# Patient Record
Sex: MALE | Race: BLACK OR AFRICAN AMERICAN | NOT HISPANIC OR LATINO | Employment: STUDENT | URBAN - METROPOLITAN AREA
[De-identification: names, ages, dates, MRNs, and addresses within clinical notes are randomized per-mention and may not be internally consistent; named-entity substitution may affect disease eponyms.]

---

## 2023-07-22 ENCOUNTER — HOSPITAL ENCOUNTER (EMERGENCY)
Facility: HOSPITAL | Age: 28
Discharge: HOME | End: 2023-07-22
Attending: EMERGENCY MEDICINE
Payer: MEDICAID

## 2023-07-22 VITALS
SYSTOLIC BLOOD PRESSURE: 154 MMHG | OXYGEN SATURATION: 98 % | HEART RATE: 77 BPM | TEMPERATURE: 98 F | DIASTOLIC BLOOD PRESSURE: 94 MMHG | RESPIRATION RATE: 16 BRPM | WEIGHT: 238.75 LBS

## 2023-07-22 DIAGNOSIS — S02.2XXA CLOSED FRACTURE OF NASAL BONE, INITIAL ENCOUNTER: ICD-10-CM
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
## 2023-07-22 PROCEDURE — 90471 IMMUNIZATION ADMIN: CPT | Performed by: NURSE PRACTITIONER

## 2023-07-22 PROCEDURE — 25000003 PHARM REV CODE 250: Performed by: NURSE PRACTITIONER

## 2023-07-22 PROCEDURE — 99285 EMERGENCY DEPT VISIT HI MDM: CPT | Mod: 25

## 2023-07-22 PROCEDURE — 90715 TDAP VACCINE 7 YRS/> IM: CPT | Performed by: NURSE PRACTITIONER

## 2023-07-22 PROCEDURE — 63600175 PHARM REV CODE 636 W HCPCS: Performed by: NURSE PRACTITIONER

## 2023-07-22 RX ORDER — HYDROCODONE BITARTRATE AND ACETAMINOPHEN 5; 325 MG/1; MG/1
1 TABLET ORAL EVERY 6 HOURS PRN
Qty: 12 TABLET | Refills: 0 | Status: SHIPPED | OUTPATIENT
Start: 2023-07-22

## 2023-07-22 RX ORDER — AMOXICILLIN AND CLAVULANATE POTASSIUM 875; 125 MG/1; MG/1
1 TABLET, FILM COATED ORAL
Status: COMPLETED | OUTPATIENT
Start: 2023-07-22 | End: 2023-07-22

## 2023-07-22 RX ORDER — AMOXICILLIN AND CLAVULANATE POTASSIUM 875; 125 MG/1; MG/1
1 TABLET, FILM COATED ORAL 2 TIMES DAILY
Qty: 14 TABLET | Refills: 0 | Status: SHIPPED | OUTPATIENT
Start: 2023-07-22 | End: 2023-07-29

## 2023-07-22 RX ORDER — ONDANSETRON 4 MG/1
4 TABLET, ORALLY DISINTEGRATING ORAL
Status: COMPLETED | OUTPATIENT
Start: 2023-07-22 | End: 2023-07-22

## 2023-07-22 RX ADMIN — AMOXICILLIN AND CLAVULANATE POTASSIUM 1 TABLET: 875; 125 TABLET, FILM COATED ORAL at 12:07

## 2023-07-22 RX ADMIN — TETANUS TOXOID, REDUCED DIPHTHERIA TOXOID AND ACELLULAR PERTUSSIS VACCINE, ADSORBED 0.5 ML: 5; 2.5; 8; 8; 2.5 SUSPENSION INTRAMUSCULAR at 12:07

## 2023-07-22 RX ADMIN — ONDANSETRON 4 MG: 4 TABLET, ORALLY DISINTEGRATING ORAL at 12:07

## 2023-07-22 NOTE — Clinical Note
"Bandar Tripp" Miguel was seen and treated in our emergency department on 7/22/2023.  He may return to work on 07/25/2023.       If you have any questions or concerns, please don't hesitate to call.      Gita LOPEZ    "
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
## 2024-06-01 ENCOUNTER — HOSPITAL ENCOUNTER (EMERGENCY)
Facility: HOSPITAL | Age: 29
Discharge: HOME | End: 2024-06-01
Attending: EMERGENCY MEDICINE

## 2024-06-01 VITALS
SYSTOLIC BLOOD PRESSURE: 156 MMHG | DIASTOLIC BLOOD PRESSURE: 99 MMHG | TEMPERATURE: 98 F | RESPIRATION RATE: 16 BRPM | WEIGHT: 233.25 LBS | HEART RATE: 66 BPM | OXYGEN SATURATION: 100 %

## 2024-06-01 DIAGNOSIS — R10.84 GENERALIZED ABDOMINAL PAIN: Primary | ICD-10-CM

## 2024-06-01 LAB
ALBUMIN SERPL BCP-MCNC: 4.3 G/DL (ref 3.5–5.2)
ALP SERPL-CCNC: 72 U/L (ref 55–135)
ALT SERPL W/O P-5'-P-CCNC: 35 U/L (ref 10–44)
ANION GAP SERPL CALC-SCNC: 10 MMOL/L (ref 8–16)
AST SERPL-CCNC: 28 U/L (ref 10–40)
BASOPHILS # BLD AUTO: 0.03 K/UL (ref 0–0.2)
BASOPHILS NFR BLD: 0.3 % (ref 0–1.9)
BILIRUB SERPL-MCNC: 0.5 MG/DL (ref 0.1–1)
BILIRUB UR QL STRIP: NEGATIVE
BUN SERPL-MCNC: 12 MG/DL (ref 6–20)
CALCIUM SERPL-MCNC: 9.8 MG/DL (ref 8.7–10.5)
CHLORIDE SERPL-SCNC: 103 MMOL/L (ref 95–110)
CLARITY UR: CLEAR
CO2 SERPL-SCNC: 26 MMOL/L (ref 23–29)
COLOR UR: YELLOW
CREAT SERPL-MCNC: 1.2 MG/DL (ref 0.5–1.4)
DIFFERENTIAL METHOD BLD: ABNORMAL
EOSINOPHIL # BLD AUTO: 0.1 K/UL (ref 0–0.5)
EOSINOPHIL NFR BLD: 1.4 % (ref 0–8)
ERYTHROCYTE [DISTWIDTH] IN BLOOD BY AUTOMATED COUNT: 13.2 % (ref 11.5–14.5)
EST. GFR  (NO RACE VARIABLE): >60 ML/MIN/1.73 M^2
GLUCOSE SERPL-MCNC: 85 MG/DL (ref 70–110)
GLUCOSE UR QL STRIP: NEGATIVE
HCT VFR BLD AUTO: 40.7 % (ref 40–54)
HCV AB SERPL QL IA: NEGATIVE
HEP C VIRUS HOLD SPECIMEN: NORMAL
HGB BLD-MCNC: 13.3 G/DL (ref 14–18)
HGB UR QL STRIP: NEGATIVE
HIV 1+2 AB+HIV1 P24 AG SERPL QL IA: NEGATIVE
IMM GRANULOCYTES # BLD AUTO: 0.05 K/UL (ref 0–0.04)
IMM GRANULOCYTES NFR BLD AUTO: 0.5 % (ref 0–0.5)
KETONES UR QL STRIP: NEGATIVE
LEUKOCYTE ESTERASE UR QL STRIP: NEGATIVE
LYMPHOCYTES # BLD AUTO: 2.3 K/UL (ref 1–4.8)
LYMPHOCYTES NFR BLD: 25.3 % (ref 18–48)
MCH RBC QN AUTO: 27.3 PG (ref 27–31)
MCHC RBC AUTO-ENTMCNC: 32.7 G/DL (ref 32–36)
MCV RBC AUTO: 84 FL (ref 82–98)
MONOCYTES # BLD AUTO: 0.6 K/UL (ref 0.3–1)
MONOCYTES NFR BLD: 7 % (ref 4–15)
NEUTROPHILS # BLD AUTO: 6 K/UL (ref 1.8–7.7)
NEUTROPHILS NFR BLD: 65.5 % (ref 38–73)
NITRITE UR QL STRIP: NEGATIVE
NRBC BLD-RTO: 0 /100 WBC
PH UR STRIP: 6 [PH] (ref 5–8)
PLATELET # BLD AUTO: 324 K/UL (ref 150–450)
PMV BLD AUTO: 9.1 FL (ref 9.2–12.9)
POTASSIUM SERPL-SCNC: 4.5 MMOL/L (ref 3.5–5.1)
PROT SERPL-MCNC: 8.3 G/DL (ref 6–8.4)
PROT UR QL STRIP: ABNORMAL
RBC # BLD AUTO: 4.87 M/UL (ref 4.6–6.2)
SODIUM SERPL-SCNC: 139 MMOL/L (ref 136–145)
SP GR UR STRIP: >1.03 (ref 1–1.03)
URN SPEC COLLECT METH UR: ABNORMAL
UROBILINOGEN UR STRIP-ACNC: NEGATIVE EU/DL
WBC # BLD AUTO: 9.14 K/UL (ref 3.9–12.7)

## 2024-06-01 PROCEDURE — 87389 HIV-1 AG W/HIV-1&-2 AB AG IA: CPT | Performed by: EMERGENCY MEDICINE

## 2024-06-01 PROCEDURE — 99284 EMERGENCY DEPT VISIT MOD MDM: CPT | Mod: 25

## 2024-06-01 PROCEDURE — 85025 COMPLETE CBC W/AUTO DIFF WBC: CPT | Performed by: NURSE PRACTITIONER

## 2024-06-01 PROCEDURE — 81003 URINALYSIS AUTO W/O SCOPE: CPT | Performed by: NURSE PRACTITIONER

## 2024-06-01 PROCEDURE — 80053 COMPREHEN METABOLIC PANEL: CPT | Performed by: NURSE PRACTITIONER

## 2024-06-01 PROCEDURE — 86803 HEPATITIS C AB TEST: CPT | Performed by: EMERGENCY MEDICINE

## 2024-06-01 RX ORDER — PANTOPRAZOLE SODIUM 40 MG/1
40 TABLET, DELAYED RELEASE ORAL DAILY
Qty: 30 TABLET | Refills: 2 | Status: SHIPPED | OUTPATIENT
Start: 2024-06-01

## 2024-06-02 NOTE — ED PROVIDER NOTES
"SCRIBE #1 NOTE: I, Abdoulaye Childers, am scribing for, and in the presence of, Roxana Ghotra MD. I have scribed the entire note.       History     Chief Complaint   Patient presents with    Abdominal Pain     Abdominal pain, left upper quadrant. Unable to have BM. Last BM was 2 days ago. States he can feel "ball " in his abd. Denies N/V/D or fever.     Review of patient's allergies indicates:  No Known Allergies      History of Present Illness     HPI    6/1/2024, 10:03 PM  History obtained from the patient      History of Present Illness: Bandar Rivera is a 29 y.o. male patient with no PMHx who presents to the Emergency Department for evaluation of abdominal pain which onset gradually 2 weeks ago. Symptoms are intermittent and mild in severity. No mitigating or exacerbating factors reported. Associated sxs include vomiting and diarrhea 2 days ago. Patient denies any , and all other sxs at this time. Prior Tx includes . No further complaints or concerns at this time.       Arrival mode: Personal vehicle     PCP: No, Primary Doctor        Past Medical History:  No past medical history on file.    Past Surgical History:  No past surgical history on file.      Family History:  No family history on file.    Social History:  Social History     Tobacco Use    Smoking status: Never    Smokeless tobacco: Not on file   Substance and Sexual Activity    Alcohol use: No    Drug use: No    Sexual activity: Not on file        Review of Systems     Review of Systems     Physical Exam     Initial Vitals [06/01/24 1754]   BP Pulse Resp Temp SpO2   (!) 156/99 66 16 98.2 °F (36.8 °C) 100 %      MAP       --          Physical Exam  Nursing Notes and Vital Signs Reviewed.  Constitutional: Patient is in no acute distress. Well-developed and well-nourished.  Head: Atraumatic. Normocephalic.  Eyes: PERRL. EOM intact. Conjunctivae are not pale. No scleral icterus.  ENT: Mucous membranes are moist. Oropharynx is clear and symmetric.    Neck: " Supple. Full ROM. No lymphadenopathy.  Cardiovascular: Regular rate. Regular rhythm. No murmurs, rubs, or gallops. Distal pulses are 2+ and symmetric.  Pulmonary/Chest: No respiratory distress. Clear to auscultation bilaterally. No wheezing or rales.  Abdominal: Soft and non-distended.  There is no tenderness.  No rebound, guarding, or rigidity. Good bowel sounds.  Genitourinary: No CVA tenderness  Musculoskeletal: Moves all extremities. No obvious deformities. No edema. No calf tenderness.  Skin: Warm and dry.  Neurological:  Alert, awake, and appropriate.  Normal speech.  No acute focal neurological deficits are appreciated.  Psychiatric: Normal affect. Good eye contact. Appropriate in content.     ED Course   Procedures  ED Vital Signs:  Vitals:    06/01/24 1754   BP: (!) 156/99   Pulse: 66   Resp: 16   Temp: 98.2 °F (36.8 °C)   TempSrc: Oral   SpO2: 100%   Weight: 105.8 kg (233 lb 4 oz)       Abnormal Lab Results:  Labs Reviewed   CBC W/ AUTO DIFFERENTIAL - Abnormal; Notable for the following components:       Result Value    Hemoglobin 13.3 (*)     MPV 9.1 (*)     Immature Grans (Abs) 0.05 (*)     All other components within normal limits   URINALYSIS, REFLEX TO URINE CULTURE - Abnormal; Notable for the following components:    Specific Gravity, UA >1.030 (*)     Protein, UA Trace (*)     All other components within normal limits    Narrative:     Specimen Source->Urine   HIV 1 / 2 ANTIBODY    Narrative:     Release to patient->Immediate   HEPATITIS C ANTIBODY    Narrative:     Release to patient->Immediate   HEP C VIRUS HOLD SPECIMEN    Narrative:     Release to patient->Immediate   COMPREHENSIVE METABOLIC PANEL        All Lab Results:  Results for orders placed or performed during the hospital encounter of 06/01/24   HIV 1/2 Ag/Ab (4th Gen)   Result Value Ref Range    HIV 1/2 Ag/Ab Negative Negative   Hepatitis C Antibody   Result Value Ref Range    Hepatitis C Ab Negative Negative   HCV Virus Hold Specimen    Result Value Ref Range    HEP C Virus Hold Specimen Hold for HCV sendout    CBC W/ AUTO DIFFERENTIAL   Result Value Ref Range    WBC 9.14 3.90 - 12.70 K/uL    RBC 4.87 4.60 - 6.20 M/uL    Hemoglobin 13.3 (L) 14.0 - 18.0 g/dL    Hematocrit 40.7 40.0 - 54.0 %    MCV 84 82 - 98 fL    MCH 27.3 27.0 - 31.0 pg    MCHC 32.7 32.0 - 36.0 g/dL    RDW 13.2 11.5 - 14.5 %    Platelets 324 150 - 450 K/uL    MPV 9.1 (L) 9.2 - 12.9 fL    Immature Granulocytes 0.5 0.0 - 0.5 %    Gran # (ANC) 6.0 1.8 - 7.7 K/uL    Immature Grans (Abs) 0.05 (H) 0.00 - 0.04 K/uL    Lymph # 2.3 1.0 - 4.8 K/uL    Mono # 0.6 0.3 - 1.0 K/uL    Eos # 0.1 0.0 - 0.5 K/uL    Baso # 0.03 0.00 - 0.20 K/uL    nRBC 0 0 /100 WBC    Gran % 65.5 38.0 - 73.0 %    Lymph % 25.3 18.0 - 48.0 %    Mono % 7.0 4.0 - 15.0 %    Eosinophil % 1.4 0.0 - 8.0 %    Basophil % 0.3 0.0 - 1.9 %    Differential Method Automated    Comp. Metabolic Panel   Result Value Ref Range    Sodium 139 136 - 145 mmol/L    Potassium 4.5 3.5 - 5.1 mmol/L    Chloride 103 95 - 110 mmol/L    CO2 26 23 - 29 mmol/L    Glucose 85 70 - 110 mg/dL    BUN 12 6 - 20 mg/dL    Creatinine 1.2 0.5 - 1.4 mg/dL    Calcium 9.8 8.7 - 10.5 mg/dL    Total Protein 8.3 6.0 - 8.4 g/dL    Albumin 4.3 3.5 - 5.2 g/dL    Total Bilirubin 0.5 0.1 - 1.0 mg/dL    Alkaline Phosphatase 72 55 - 135 U/L    AST 28 10 - 40 U/L    ALT 35 10 - 44 U/L    eGFR >60 >60 mL/min/1.73 m^2    Anion Gap 10 8 - 16 mmol/L   Urinalysis, Reflex to Urine Culture Urine, Clean Catch    Specimen: Urine   Result Value Ref Range    Specimen UA Urine, Clean Catch     Color, UA Yellow Yellow, Straw, Cherry    Appearance, UA Clear Clear    pH, UA 6.0 5.0 - 8.0    Specific Gravity, UA >1.030 (A) 1.005 - 1.030    Protein, UA Trace (A) Negative    Glucose, UA Negative Negative    Ketones, UA Negative Negative    Bilirubin (UA) Negative Negative    Occult Blood UA Negative Negative    Nitrite, UA Negative Negative    Urobilinogen, UA Negative <2.0 EU/dL     Leukocytes, UA Negative Negative        Imaging Results:  Imaging Results              X-Ray Abdomen Flat And Erect (Final result)  Result time 06/01/24 20:42:25      Final result by Arnold Kingsley MD (06/01/24 20:42:25)                   Impression:      No evidence for bowel obstruction      Electronically signed by: Arnold Kingsley  Date:    06/01/2024  Time:    20:42               Narrative:    EXAMINATION:  XR ABDOMEN FLAT AND ERECT    CLINICAL HISTORY:  Constipation;    TECHNIQUE:  Flat and erect AP views of the abdomen were performed.    COMPARISON:  None    FINDINGS:  No evidence for distended loops of bowel or air-fluid levels.  No evidence for free air.  No abnormal calcifications seen.  No acute osseous injury.  Mild may be moderate amount of stool in the colon                                            The Emergency Provider reviewed the vital signs and test results, which are outlined above.     ED Discussion     10:06 PM: Reassessed pt at this time.  Pt states his condition has  at this time. Discussed with pt all pertinent ED information and results. Discussed pt dx and plan of tx. Gave pt all f/u and return to the ED instructions. All questions and concerns were addressed at this time. Pt expresses understanding of information and instructions, and is comfortable with plan to discharge. Pt is stable for discharge.    I discussed with patient and/or family/caretaker that evaluation in the ED does not suggest any emergent or life threatening medical conditions requiring immediate intervention beyond what was provided in the ED, and I believe patient is safe for discharge.  Regardless, an unremarkable evaluation in the ED does not preclude the development or presence of a serious of life threatening condition. As such, patient was instructed to return immediately for any worsening or change in current symptoms.        Medical Decision Making  Risk  Prescription drug management.                ED  Medication(s):  Medications - No data to display    Discharge Medication List as of 6/1/2024 10:06 PM        START taking these medications    Details   pantoprazole (PROTONIX) 40 MG tablet Take 1 tablet (40 mg total) by mouth once daily., Starting Sat 6/1/2024, Print              Follow-up Information       [facility] In 2 days.    Specialty: Gastroenterology  Contact information:  [address] [facility] [address] [phone]             [facility] - Emergency Dept..    Specialty: Emergency Medicine  Why: As needed, If symptoms worsen  Contact information:  [address] [facility] [phone]                               Scribe Attestation:   Scribe #1: I performed the above scribed service and the documentation accurately describes the services I performed. I attest to the accuracy of the note.     Attending:   Physician Attestation Statement for Scribe #1: I, Roxana Ghotra MD, personally performed the services described in this documentation, as scribed by Abdoulaye Childers, in my presence, and it is both accurate and complete.           Clinical Impression       ICD-10-CM ICD-9-CM   1. Generalized abdominal pain  R10.84 789.07              Roxana Ghotra MD  06/02/24 0629

## 2024-06-02 NOTE — FIRST PROVIDER EVALUATION
" Emergency Department TeleTriage Encounter Note      CHIEF COMPLAINT    Chief Complaint   Patient presents with    Abdominal Pain     Abdominal pain, left upper quadrant. Unable to have BM. Last BM was 2 days ago. States he can feel "ball " in his abd. Denies N/V/D or fever.       VITAL SIGNS   Initial Vitals [06/01/24 1754]   BP Pulse Resp Temp SpO2   (!) 156/99 66 16 98.2 °F (36.8 °C) 100 %      MAP       --            ALLERGIES    Review of patient's allergies indicates:  No Known Allergies    PROVIDER TRIAGE NOTE  Verbal consent for the teletriage evaluation was given by the patient at the start of the evaluation.  All efforts will be made to maintain patient's privacy during the evaluation.      This is a teletriage evaluation of a 29 y.o. male presenting to the ED with c/o abdominal pain and constipation (last BM 3 days ago).  Reports "swelling in abdomen" that started 2 weeks ago. Limited physical exam via telehealth: The patient is awake, alert, answering questions appropriately and is not in respiratory distress.  As the Teletriage provider, I performed an initial assessment and ordered appropriate labs and imaging studies, if any, to facilitate the patient's care once placed in the ED. Once a room is available, care and a full evaluation will be completed by an alternate ED provider.  Any additional orders and the final disposition will be determined by that provider.  All imaging and labs will not be followed-up by the Teletriage Team, including myself.          ORDERS  Labs Reviewed   HIV 1 / 2 ANTIBODY   HEPATITIS C ANTIBODY   HEP C VIRUS HOLD SPECIMEN       ED Orders (720h ago, onward)      Start Ordered     Status Ordering Provider    06/01/24 1908 06/01/24 1907  Vital signs  Every 2 hours         Ordered BERTHA AVALOS    06/01/24 1907 06/01/24 1907  Diet NPO  Diet effective now         Ordered BERTHA AVALOS    06/01/24 1907 06/01/24 1907  Insert peripheral IV  Once         Ordered BERTHA AVALOS    " "06/01/24 1907 06/01/24 1907  CBC W/ AUTO DIFFERENTIAL  STAT         Ordered BERTHA AVALOS    06/01/24 1907 06/01/24 1907  Comp. Metabolic Panel  STAT         Ordered BERTHA AVALOS    06/01/24 1907 06/01/24 1907  Urinalysis, Reflex to Urine Culture Urine, Clean Catch  STAT         Ordered BERTHA AVALOS    06/01/24 1907 06/01/24 1907  X-Ray Abdomen Flat And Erect  1 time imaging         Ordered BERTHA AVALOS    06/01/24 1756 06/01/24 1755  HIV 1/2 Ag/Ab (4th Gen)  STAT         Ordered KIKO PILLAI P.    06/01/24 1756 06/01/24 1755  Hepatitis C Antibody  STAT        Placed in "And" Linked Group    Ordered KIKO PILLAI P.    06/01/24 1756 06/01/24 1755  HCV Virus Hold Specimen  STAT        Placed in "And" Linked Group    Ordered KIKO PILLAI P.              Virtual Visit Note: The provider triage portion of this emergency department evaluation and documentation was performed via [employer], a HIPAA-compliant telemedicine application, in concert with a tele-presenter in the room. A face to face patient evaluation with one of my colleagues will occur once the patient is placed in an emergency department room.      DISCLAIMER: This note was prepared with [employer] voice recognition transcription software. Garbled syntax, mangled pronouns, and other bizarre constructions may be attributed to that software system.    "